# Patient Record
Sex: MALE | Race: WHITE | NOT HISPANIC OR LATINO
[De-identification: names, ages, dates, MRNs, and addresses within clinical notes are randomized per-mention and may not be internally consistent; named-entity substitution may affect disease eponyms.]

---

## 2018-03-05 PROBLEM — Z00.00 ENCOUNTER FOR PREVENTIVE HEALTH EXAMINATION: Status: ACTIVE | Noted: 2018-03-05

## 2018-03-27 ENCOUNTER — APPOINTMENT (OUTPATIENT)
Dept: OTOLARYNGOLOGY | Facility: CLINIC | Age: 48
End: 2018-03-27

## 2019-03-08 ENCOUNTER — OTHER (OUTPATIENT)
Age: 49
End: 2019-03-08

## 2019-03-08 ENCOUNTER — APPOINTMENT (OUTPATIENT)
Dept: OTOLARYNGOLOGY | Facility: CLINIC | Age: 49
End: 2019-03-08
Payer: MEDICAID

## 2019-03-08 VITALS
BODY MASS INDEX: 34.4 KG/M2 | HEIGHT: 72 IN | DIASTOLIC BLOOD PRESSURE: 86 MMHG | WEIGHT: 254 LBS | HEART RATE: 76 BPM | SYSTOLIC BLOOD PRESSURE: 124 MMHG

## 2019-03-08 DIAGNOSIS — R07.0 PAIN IN THROAT: ICD-10-CM

## 2019-03-08 DIAGNOSIS — Z78.9 OTHER SPECIFIED HEALTH STATUS: ICD-10-CM

## 2019-03-08 DIAGNOSIS — Z86.39 PERSONAL HISTORY OF OTHER ENDOCRINE, NUTRITIONAL AND METABOLIC DISEASE: ICD-10-CM

## 2019-03-08 PROCEDURE — 31575 DIAGNOSTIC LARYNGOSCOPY: CPT

## 2019-03-08 PROCEDURE — 99204 OFFICE O/P NEW MOD 45 MIN: CPT | Mod: 25

## 2019-03-08 RX ORDER — FLUTICASONE PROPIONATE 50 UG/1
50 SPRAY, METERED NASAL DAILY
Qty: 1 | Refills: 5 | Status: ACTIVE | COMMUNITY
Start: 2019-03-08 | End: 1900-01-01

## 2019-03-08 RX ORDER — ESOMEPRAZOLE MAGNESIUM 40 MG/1
40 CAPSULE, DELAYED RELEASE ORAL TWICE DAILY
Qty: 60 | Refills: 2 | Status: ACTIVE | COMMUNITY
Start: 2019-03-08 | End: 1900-01-01

## 2019-03-11 ENCOUNTER — OUTPATIENT (OUTPATIENT)
Dept: OUTPATIENT SERVICES | Facility: HOSPITAL | Age: 49
LOS: 1 days | End: 2019-03-11
Payer: MEDICAID

## 2019-03-11 ENCOUNTER — APPOINTMENT (OUTPATIENT)
Dept: SLEEP CENTER | Facility: HOSPITAL | Age: 49
End: 2019-03-11

## 2019-03-11 DIAGNOSIS — G47.33 OBSTRUCTIVE SLEEP APNEA (ADULT) (PEDIATRIC): ICD-10-CM

## 2019-03-11 PROBLEM — Z86.39 HISTORY OF HIGH CHOLESTEROL: Status: RESOLVED | Noted: 2019-03-08 | Resolved: 2019-03-11

## 2019-03-11 PROBLEM — R07.0 THROAT DISCOMFORT: Status: ACTIVE | Noted: 2019-03-11

## 2019-03-11 PROBLEM — Z78.9 CONSUMES ALCOHOL: Status: ACTIVE | Noted: 2019-03-08

## 2019-03-11 PROCEDURE — 95810 POLYSOM 6/> YRS 4/> PARAM: CPT | Mod: 26

## 2019-03-11 PROCEDURE — 95810 POLYSOM 6/> YRS 4/> PARAM: CPT

## 2019-03-11 NOTE — CONSULT LETTER
[Dear  ___] : Dear  [unfilled], [Courtesy Letter:] : I had the pleasure of seeing your patient, [unfilled], in my office today. [Consult Closing:] : Thank you very much for allowing me to participate in the care of this patient.  If you have any questions, please do not hesitate to contact me. [Sincerely,] : Sincerely, [FreeTextEntry3] : Major Ornelas MD\par Department of Otolaryngology - Head and Neck Surgery\par [facility]\par

## 2019-03-11 NOTE — PHYSICAL EXAM
[Nasal Endoscopy Performed] : nasal endoscopy was performed, see procedure section for findings [] : septum deviated bilaterally [Midline] : trachea located in midline position [Laryngoscopy Performed] : laryngoscopy was performed, see procedure section for findings [Normal] : no rashes [de-identified] : crowded oropharynx, thick long uvula, no drainage

## 2019-03-11 NOTE — HISTORY OF PRESENT ILLNESS
[de-identified] : 48M here for initial evaluation.\par \par For the past 5-6 months, pt reports throat discomfort. When he swallows he feels 'something touching' on the right side, worse with colder foods and liquids. There is no difficulty eating, breathing, swallowing or talking, no voice change or regurgitation, no hemoptysis. \par No known reflux, sinusitis, allergies or snoring.\par \par He has prior CT sinus report from 2/2018 - I do not have images - mentioned bilateral maxillary sinus mucosal thickening.\par \par ROS otherwise unremarkable.

## 2019-03-11 NOTE — PROCEDURE
[FreeTextEntry3] : Nasal Endoscopy:\par turbinate hypertrophy, mild bilateral septal deviation\par sinonasal mucosal erythema\par moderate postnasal drip\par \par Fiberoptic Laryngoscopy:\par upper airway widely patent\par moderate postcricoid edema\par TVF mobile and symmetric\par small tuft of whitish plaque over posterior right false fold, no friable masses/lesions

## 2019-03-11 NOTE — ASSESSMENT
[FreeTextEntry1] : 48M here for initial evaluation. For the past 5-6 months, pt reports throat discomfort. When he swallows he feels 'something touching' on the right side, worse with colder foods and liquids. There is no difficulty eating, breathing, swallowing or talking, there is no voice change or regurgitation and no hemoptysis. There is no known reflux, h/o sinusitis, allergies or snoring. He has a prior CT sinus report from 2/2018 which mentioned bilateral maxillary sinus mucosal thickening, but I do not have images. On exam, there is a crowded oropharynx and thick long uvula. Nasal endoscopy shows turbinate hypertrophy, mild bilateral septal deviation and sinonasal mucosal erythema with a\par moderate postnasal drip. Fiberoptic laryngoscopy shows moderate hypopharyngeal edema and a small tuft of whitish plaque over the posterior right supraglottis. The rest of the head and neck exam is otherwise unremarkable.\par Unclear etiology to his throat symptoms, however, they are likely multifactorial and will control for all.\par Nasal endoscopy c/w rhinitis (turbinate hypertrophy, sinonasal mucosal erythema, postnasal drip). Anatomy c/w obstructive sleep apnea (crowded oropharynx and thick long uvula). Laryngoscopy also c/w reflux-related changes (moderate hypopharyngeal edema). \par There is also a whitish lesion over the right posterior supraglottis on laryngoscopy - this could be fungal vs leukoplakia? Need to reassess on followup visit.\par I will start nasal steroid spray daily for the rhinitis, daily PPI with strict diet/lifestyle change for the reflux and will also get formal sleep study to see if there is CHRISTINE and how severe. RTO 6-8 weeks. At that time, will bring images of prior CT as well.\par All questions answered.